# Patient Record
Sex: FEMALE | Race: WHITE | NOT HISPANIC OR LATINO
[De-identification: names, ages, dates, MRNs, and addresses within clinical notes are randomized per-mention and may not be internally consistent; named-entity substitution may affect disease eponyms.]

---

## 2017-08-29 PROBLEM — Z00.00 ENCOUNTER FOR PREVENTIVE HEALTH EXAMINATION: Status: ACTIVE | Noted: 2017-08-29

## 2017-08-30 ENCOUNTER — APPOINTMENT (OUTPATIENT)
Dept: NEUROLOGY | Facility: CLINIC | Age: 32
End: 2017-08-30
Payer: COMMERCIAL

## 2017-08-30 VITALS
OXYGEN SATURATION: 98 % | HEART RATE: 82 BPM | HEIGHT: 66 IN | TEMPERATURE: 98.2 F | SYSTOLIC BLOOD PRESSURE: 92 MMHG | BODY MASS INDEX: 28.93 KG/M2 | DIASTOLIC BLOOD PRESSURE: 58 MMHG | WEIGHT: 180 LBS

## 2017-08-30 DIAGNOSIS — Z80.6 FAMILY HISTORY OF LEUKEMIA: ICD-10-CM

## 2017-08-30 DIAGNOSIS — Z80.3 FAMILY HISTORY OF MALIGNANT NEOPLASM OF BREAST: ICD-10-CM

## 2017-08-30 DIAGNOSIS — Z84.89 FAMILY HISTORY OF OTHER SPECIFIED CONDITIONS: ICD-10-CM

## 2017-08-30 PROCEDURE — 99214 OFFICE O/P EST MOD 30 MIN: CPT

## 2017-09-08 LAB — OXCARBAZEPINE SERPL-MCNC: 11 UG/ML

## 2017-09-25 ENCOUNTER — APPOINTMENT (OUTPATIENT)
Dept: NEUROLOGY | Facility: CLINIC | Age: 32
End: 2017-09-25
Payer: COMMERCIAL

## 2017-09-25 VITALS
SYSTOLIC BLOOD PRESSURE: 108 MMHG | DIASTOLIC BLOOD PRESSURE: 74 MMHG | OXYGEN SATURATION: 97 % | HEART RATE: 83 BPM | TEMPERATURE: 98 F | HEIGHT: 66 IN

## 2017-09-25 DIAGNOSIS — G40.109 LOCALIZATION-RELATED (FOCAL) (PARTIAL) SYMPTOMATIC EPILEPSY AND EPILEPTIC SYNDROMES WITH SIMPLE PARTIAL SEIZURES, NOT INTRACTABLE, W/OUT STATUS EPILEPTICUS: ICD-10-CM

## 2017-09-25 DIAGNOSIS — Z78.9 OTHER SPECIFIED HEALTH STATUS: ICD-10-CM

## 2017-09-25 PROCEDURE — 99214 OFFICE O/P EST MOD 30 MIN: CPT

## 2017-09-26 ENCOUNTER — RX RENEWAL (OUTPATIENT)
Age: 32
End: 2017-09-26

## 2017-09-29 LAB — OXCARBAZEPINE SERPL-MCNC: 11 UG/ML

## 2017-11-01 ENCOUNTER — RX RENEWAL (OUTPATIENT)
Age: 32
End: 2017-11-01

## 2017-12-13 ENCOUNTER — APPOINTMENT (OUTPATIENT)
Dept: NEUROLOGY | Facility: CLINIC | Age: 32
End: 2017-12-13
Payer: COMMERCIAL

## 2017-12-13 ENCOUNTER — LABORATORY RESULT (OUTPATIENT)
Age: 32
End: 2017-12-13

## 2017-12-13 VITALS
WEIGHT: 173.13 LBS | TEMPERATURE: 98.7 F | DIASTOLIC BLOOD PRESSURE: 81 MMHG | BODY MASS INDEX: 27.17 KG/M2 | SYSTOLIC BLOOD PRESSURE: 129 MMHG | HEIGHT: 67 IN | HEART RATE: 75 BPM | OXYGEN SATURATION: 97 %

## 2017-12-13 PROCEDURE — 99214 OFFICE O/P EST MOD 30 MIN: CPT

## 2018-01-11 ENCOUNTER — RX RENEWAL (OUTPATIENT)
Age: 33
End: 2018-01-11

## 2018-04-12 ENCOUNTER — RX RENEWAL (OUTPATIENT)
Age: 33
End: 2018-04-12

## 2018-07-05 ENCOUNTER — OTHER (OUTPATIENT)
Age: 33
End: 2018-07-05

## 2018-07-11 ENCOUNTER — OTHER (OUTPATIENT)
Age: 33
End: 2018-07-11

## 2018-07-11 ENCOUNTER — APPOINTMENT (OUTPATIENT)
Dept: NEUROLOGY | Facility: CLINIC | Age: 33
End: 2018-07-11
Payer: COMMERCIAL

## 2018-07-11 VITALS
WEIGHT: 171 LBS | SYSTOLIC BLOOD PRESSURE: 103 MMHG | HEART RATE: 71 BPM | BODY MASS INDEX: 27.48 KG/M2 | TEMPERATURE: 97.5 F | HEIGHT: 66 IN | OXYGEN SATURATION: 97 % | DIASTOLIC BLOOD PRESSURE: 66 MMHG

## 2018-07-11 PROCEDURE — 99214 OFFICE O/P EST MOD 30 MIN: CPT

## 2018-08-14 ENCOUNTER — RX RENEWAL (OUTPATIENT)
Age: 33
End: 2018-08-14

## 2018-08-22 ENCOUNTER — RX RENEWAL (OUTPATIENT)
Age: 33
End: 2018-08-22

## 2018-09-05 ENCOUNTER — RX CHANGE (OUTPATIENT)
Age: 33
End: 2018-09-05

## 2018-09-05 ENCOUNTER — RX RENEWAL (OUTPATIENT)
Age: 33
End: 2018-09-05

## 2018-10-03 ENCOUNTER — RX RENEWAL (OUTPATIENT)
Age: 33
End: 2018-10-03

## 2018-10-03 ENCOUNTER — OTHER (OUTPATIENT)
Age: 33
End: 2018-10-03

## 2018-10-05 ENCOUNTER — RX RENEWAL (OUTPATIENT)
Age: 33
End: 2018-10-05

## 2018-10-22 ENCOUNTER — RX RENEWAL (OUTPATIENT)
Age: 33
End: 2018-10-22

## 2018-10-24 ENCOUNTER — OTHER (OUTPATIENT)
Age: 33
End: 2018-10-24

## 2018-11-26 ENCOUNTER — APPOINTMENT (OUTPATIENT)
Dept: NEUROLOGY | Facility: CLINIC | Age: 33
End: 2018-11-26
Payer: COMMERCIAL

## 2018-11-26 VITALS
WEIGHT: 165 LBS | HEIGHT: 66 IN | OXYGEN SATURATION: 98 % | HEART RATE: 71 BPM | BODY MASS INDEX: 26.52 KG/M2 | SYSTOLIC BLOOD PRESSURE: 101 MMHG | TEMPERATURE: 97.7 F | DIASTOLIC BLOOD PRESSURE: 65 MMHG

## 2018-11-26 PROCEDURE — 99214 OFFICE O/P EST MOD 30 MIN: CPT

## 2018-12-24 ENCOUNTER — RX RENEWAL (OUTPATIENT)
Age: 33
End: 2018-12-24

## 2019-01-22 ENCOUNTER — RX RENEWAL (OUTPATIENT)
Age: 34
End: 2019-01-22

## 2019-02-28 ENCOUNTER — OTHER (OUTPATIENT)
Age: 34
End: 2019-02-28

## 2019-03-11 ENCOUNTER — APPOINTMENT (OUTPATIENT)
Dept: NEUROLOGY | Facility: CLINIC | Age: 34
End: 2019-03-11
Payer: COMMERCIAL

## 2019-03-11 VITALS
BODY MASS INDEX: 26.7 KG/M2 | DIASTOLIC BLOOD PRESSURE: 70 MMHG | HEART RATE: 68 BPM | SYSTOLIC BLOOD PRESSURE: 114 MMHG | TEMPERATURE: 97.7 F | OXYGEN SATURATION: 99 % | WEIGHT: 136 LBS | HEIGHT: 60 IN

## 2019-03-11 PROCEDURE — 99214 OFFICE O/P EST MOD 30 MIN: CPT

## 2019-03-11 NOTE — DISCUSSION/SUMMARY
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

## 2019-03-11 NOTE — HISTORY OF PRESENT ILLNESS
[FreeTextEntry1] : Follow up\par \par This is a followup on this young woman who has a history of focal epilepsy with an auditory aura and pregnancy.\par \par 34 y/o woman w focal epilepsy\par \par \par \par \par \par \par HPI\par Started Vimpat and while on titration she had some seizures.\par Now on Vimpat 200 mg BID and she is better. \par Working full time\par Baby is doing fine.  \par January 31 had a major a seizure with GTC sz. Had an aura of humming noise and tried to use the Midazolam but it didn’t work. She had a major GTC sz. \par Not having side effects from Vimpat.\par Takes Vimpat 200 mg 8 am and 200 mg PM\par Baby 15 months\par \par \par

## 2019-03-11 NOTE — PHYSICAL EXAM
[Person] : oriented to person [Place] : oriented to place [Time] : oriented to time [Concentration Intact] : normal concentrating ability [Visual Intact] : visual attention was ~T not ~L decreased [Naming Objects] : no difficulty naming common objects [Repeating Phrases] : no difficulty repeating a phrase [Writing A Sentence] : no difficulty writing a sentence [Fluency] : fluency intact [Comprehension] : comprehension intact [Reading] : reading intact [Past History] : adequate knowledge of personal past history [Cranial Nerves Optic (II)] : visual acuity intact bilaterally,  visual fields full to confrontation, pupils equal round and reactive to light [Cranial Nerves Oculomotor (III)] : extraocular motion intact [Cranial Nerves Trigeminal (V)] : facial sensation intact symmetrically [Cranial Nerves Facial (VII)] : face symmetrical [Cranial Nerves Vestibulocochlear (VIII)] : hearing was intact bilaterally [Cranial Nerves Glossopharyngeal (IX)] : tongue and palate midline [Motor Tone] : muscle tone was normal in all four extremities [Motor Strength] : muscle strength was normal in all four extremities [No Muscle Atrophy] : normal bulk in all four extremities [Motor Handedness Left-Handed] : the patient is left hand dominant [Sensation Tactile Decrease] : light touch was intact [Abnormal Walk] : normal gait [Balance] : balance was intact [2+] : Ankle jerk left 2+ [Past-pointing] : there was no past-pointing [Tremor] : no tremor present [Plantar Reflex Right Only] : normal on the right [Plantar Reflex Left Only] : normal on the left

## 2019-03-14 ENCOUNTER — OTHER (OUTPATIENT)
Age: 34
End: 2019-03-14

## 2019-03-20 ENCOUNTER — OTHER (OUTPATIENT)
Age: 34
End: 2019-03-20

## 2019-04-01 ENCOUNTER — OTHER (OUTPATIENT)
Age: 34
End: 2019-04-01

## 2019-04-01 ENCOUNTER — RX RENEWAL (OUTPATIENT)
Age: 34
End: 2019-04-01

## 2019-04-02 ENCOUNTER — APPOINTMENT (OUTPATIENT)
Dept: NEUROLOGY | Facility: CLINIC | Age: 34
End: 2019-04-02
Payer: COMMERCIAL

## 2019-04-02 PROCEDURE — 95816 EEG AWAKE AND DROWSY: CPT

## 2019-04-03 ENCOUNTER — APPOINTMENT (OUTPATIENT)
Dept: NEUROLOGY | Facility: CLINIC | Age: 34
End: 2019-04-03

## 2019-04-03 PROCEDURE — 95953: CPT

## 2019-04-08 ENCOUNTER — OTHER (OUTPATIENT)
Age: 34
End: 2019-04-08

## 2019-04-10 ENCOUNTER — RX RENEWAL (OUTPATIENT)
Age: 34
End: 2019-04-10

## 2019-04-30 ENCOUNTER — OTHER (OUTPATIENT)
Age: 34
End: 2019-04-30

## 2019-05-15 ENCOUNTER — RX RENEWAL (OUTPATIENT)
Age: 34
End: 2019-05-15

## 2019-05-15 ENCOUNTER — OTHER (OUTPATIENT)
Age: 34
End: 2019-05-15

## 2019-05-20 ENCOUNTER — APPOINTMENT (OUTPATIENT)
Dept: NEUROLOGY | Facility: CLINIC | Age: 34
End: 2019-05-20
Payer: COMMERCIAL

## 2019-05-20 VITALS
BODY MASS INDEX: 31.15 KG/M2 | DIASTOLIC BLOOD PRESSURE: 72 MMHG | SYSTOLIC BLOOD PRESSURE: 114 MMHG | HEIGHT: 61 IN | TEMPERATURE: 98.4 F | HEART RATE: 64 BPM | WEIGHT: 165 LBS | OXYGEN SATURATION: 99 %

## 2019-05-20 PROCEDURE — 99214 OFFICE O/P EST MOD 30 MIN: CPT

## 2019-05-20 NOTE — DISCUSSION/SUMMARY
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

## 2019-05-20 NOTE — HISTORY OF PRESENT ILLNESS
[FreeTextEntry1] : Follow up\par \par This is a followup on this young woman who has a history of focal epilepsy with an auditory aura and pregnancy.\par \par 34 y/o woman w focal epilepsy\par \par \par \par \par \par \par HPI\par Started Vimpat and while on titration she had some seizures.\par \par Working full time\par \par January 31 had a major a seizure with GTC sz. Had an aura of humming noise and tried to use the Midazolam but it didn’t work. She had a major GTC sz. \par \par Takes Vimpat 200 mg 8 am and 200 mg PM\par \par Another GTC sz April 8 \par \par Another GTC sz last week\par \par Meds\par Trileptal 300 mg BID\par Vimpat 50 mg QD\par \par \par \par \par \par

## 2019-05-20 NOTE — PHYSICAL EXAM
[Person] : oriented to person [Place] : oriented to place [Time] : oriented to time [Concentration Intact] : normal concentrating ability [Visual Intact] : visual attention was ~T not ~L decreased [Naming Objects] : no difficulty naming common objects [Repeating Phrases] : no difficulty repeating a phrase [Writing A Sentence] : no difficulty writing a sentence [Fluency] : fluency intact [Comprehension] : comprehension intact [Reading] : reading intact [Past History] : adequate knowledge of personal past history [Cranial Nerves Optic (II)] : visual acuity intact bilaterally,  visual fields full to confrontation, pupils equal round and reactive to light [Cranial Nerves Oculomotor (III)] : extraocular motion intact [Cranial Nerves Trigeminal (V)] : facial sensation intact symmetrically [Cranial Nerves Facial (VII)] : face symmetrical [Cranial Nerves Vestibulocochlear (VIII)] : hearing was intact bilaterally [Cranial Nerves Glossopharyngeal (IX)] : tongue and palate midline [Motor Tone] : muscle tone was normal in all four extremities [Motor Strength] : muscle strength was normal in all four extremities [No Muscle Atrophy] : normal bulk in all four extremities [Motor Handedness Left-Handed] : the patient is left hand dominant [Sensation Tactile Decrease] : light touch was intact [Abnormal Walk] : normal gait [Balance] : balance was intact [Past-pointing] : there was no past-pointing [Tremor] : no tremor present [2+] : Ankle jerk left 2+ [Plantar Reflex Right Only] : normal on the right [Plantar Reflex Left Only] : normal on the left

## 2019-05-20 NOTE — ASSESSMENT
[FreeTextEntry1] : Increase Trileptal to 300 mg am and 450 mg PM\par Levels from last week and adjust if needed

## 2019-05-21 ENCOUNTER — OTHER (OUTPATIENT)
Age: 34
End: 2019-05-21

## 2019-05-22 ENCOUNTER — RX RENEWAL (OUTPATIENT)
Age: 34
End: 2019-05-22

## 2019-05-22 ENCOUNTER — OTHER (OUTPATIENT)
Age: 34
End: 2019-05-22

## 2019-06-05 ENCOUNTER — RX RENEWAL (OUTPATIENT)
Age: 34
End: 2019-06-05

## 2019-06-11 ENCOUNTER — RX RENEWAL (OUTPATIENT)
Age: 34
End: 2019-06-11

## 2019-06-12 ENCOUNTER — OTHER (OUTPATIENT)
Age: 34
End: 2019-06-12

## 2019-06-17 ENCOUNTER — OTHER (OUTPATIENT)
Age: 34
End: 2019-06-17

## 2019-06-19 ENCOUNTER — OTHER (OUTPATIENT)
Age: 34
End: 2019-06-19

## 2019-07-03 ENCOUNTER — OTHER (OUTPATIENT)
Age: 34
End: 2019-07-03

## 2019-07-29 ENCOUNTER — RX RENEWAL (OUTPATIENT)
Age: 34
End: 2019-07-29

## 2019-08-05 ENCOUNTER — APPOINTMENT (OUTPATIENT)
Dept: NEUROLOGY | Facility: CLINIC | Age: 34
End: 2019-08-05

## 2019-08-12 ENCOUNTER — APPOINTMENT (OUTPATIENT)
Dept: NEUROLOGY | Facility: CLINIC | Age: 34
End: 2019-08-12
Payer: COMMERCIAL

## 2019-08-12 VITALS
WEIGHT: 165 LBS | HEART RATE: 95 BPM | SYSTOLIC BLOOD PRESSURE: 128 MMHG | DIASTOLIC BLOOD PRESSURE: 80 MMHG | HEIGHT: 61 IN | BODY MASS INDEX: 31.15 KG/M2 | OXYGEN SATURATION: 96 %

## 2019-08-12 DIAGNOSIS — Z3A.27 27 WEEKS GESTATION OF PREGNANCY: ICD-10-CM

## 2019-08-12 PROCEDURE — 99214 OFFICE O/P EST MOD 30 MIN: CPT

## 2019-08-12 RX ORDER — LACOSAMIDE 200 MG/1
200 TABLET, FILM COATED ORAL
Qty: 60 | Refills: 2 | Status: DISCONTINUED | COMMUNITY
Start: 2018-07-11 | End: 2019-08-12

## 2019-08-12 NOTE — REVIEW OF SYSTEMS
[Decr. Concentrating Ability] : decreased concentrating ability [Seizures] : convulsions [Negative] : Musculoskeletal

## 2019-08-12 NOTE — HISTORY OF PRESENT ILLNESS
[FreeTextEntry1] : Follow up\par \par This is a followup on this young woman who has a history of focal epilepsy with an auditory aura and pregnancy.\par \par 35 y/o woman w focal epilepsy\par \par \par \par \par \par \par HPI\par Started Vimpat and while on titration she had some seizures.\par \par Working full time\par \par January 31 had a major a seizure with GTC sz. Had an aura of humming noise and tried to use the Midazolam but it didn’t work. She had a major GTC sz. \par \par Pregnancy 20 week gestation\par \par last Sz GTC May\par \par \par Another GTC sz April 8 \par \par \par Meds\par Trileptal 750 mg BID\par Folic acid 2 mg/day\par PRN Klonopin \par \par \par \par \par \par \par \par

## 2019-08-12 NOTE — DISCUSSION/SUMMARY
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

## 2019-08-15 LAB — OXCARBAZEPINE SERPL-MCNC: 19 UG/ML

## 2019-08-19 ENCOUNTER — RX RENEWAL (OUTPATIENT)
Age: 34
End: 2019-08-19

## 2019-08-23 ENCOUNTER — OTHER (OUTPATIENT)
Age: 34
End: 2019-08-23

## 2019-09-16 ENCOUNTER — OTHER (OUTPATIENT)
Age: 34
End: 2019-09-16

## 2019-09-16 ENCOUNTER — RX RENEWAL (OUTPATIENT)
Age: 34
End: 2019-09-16

## 2019-10-28 ENCOUNTER — APPOINTMENT (OUTPATIENT)
Dept: NEUROLOGY | Facility: CLINIC | Age: 34
End: 2019-10-28
Payer: COMMERCIAL

## 2019-10-28 VITALS
WEIGHT: 165 LBS | DIASTOLIC BLOOD PRESSURE: 68 MMHG | HEIGHT: 61 IN | OXYGEN SATURATION: 98 % | BODY MASS INDEX: 31.15 KG/M2 | TEMPERATURE: 98.1 F | HEART RATE: 78 BPM | SYSTOLIC BLOOD PRESSURE: 109 MMHG

## 2019-10-28 PROCEDURE — 99214 OFFICE O/P EST MOD 30 MIN: CPT

## 2019-10-28 NOTE — DISCUSSION/SUMMARY
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

## 2019-10-28 NOTE — HISTORY OF PRESENT ILLNESS
[FreeTextEntry1] : Follow up\par \par This is a followup on this young woman who has a history of focal epilepsy with an auditory aura and pregnancy.\par \par 35 y/o woman w focal epilepsy\par \par \par \par \par \par \par HPI\par trileptal up to 900 mg am and 750 mg PM\par No auras or GTc sz but feeling in the head 1-2 week. Last minutes. \par \par \par Pregnancy 32 week gestation\par \par Due date Dec 26, 2019\par \par last Sz GTC May\par \par \par Another GTC sz April 8, 2019\par \par \par Meds\par Trileptal 750 mg BID\par Folic acid 2 mg/day\par PRN Klonopin \par \par \par \par \par \par \par \par

## 2019-10-29 ENCOUNTER — RX RENEWAL (OUTPATIENT)
Age: 34
End: 2019-10-29

## 2019-10-29 LAB
BASOPHILS # BLD AUTO: 0.01 K/UL
BASOPHILS NFR BLD AUTO: 0.2 %
EOSINOPHIL # BLD AUTO: 0.06 K/UL
EOSINOPHIL NFR BLD AUTO: 0.9 %
HCT VFR BLD CALC: 34.2 %
HGB BLD-MCNC: 11.4 G/DL
IMM GRANULOCYTES NFR BLD AUTO: 0.5 %
LYMPHOCYTES # BLD AUTO: 1.62 K/UL
LYMPHOCYTES NFR BLD AUTO: 25 %
MAN DIFF?: NORMAL
MCHC RBC-ENTMCNC: 30.7 PG
MCHC RBC-ENTMCNC: 33.3 GM/DL
MCV RBC AUTO: 92.2 FL
MONOCYTES # BLD AUTO: 0.41 K/UL
MONOCYTES NFR BLD AUTO: 6.3 %
NEUTROPHILS # BLD AUTO: 4.35 K/UL
NEUTROPHILS NFR BLD AUTO: 67.1 %
PLATELET # BLD AUTO: 185 K/UL
RBC # BLD: 3.71 M/UL
RBC # FLD: 12.3 %
WBC # FLD AUTO: 6.48 K/UL

## 2019-10-31 LAB — OXCARBAZEPINE SERPL-MCNC: 13 UG/ML

## 2019-11-08 ENCOUNTER — RX RENEWAL (OUTPATIENT)
Age: 34
End: 2019-11-08

## 2019-11-19 ENCOUNTER — TRANSCRIPTION ENCOUNTER (OUTPATIENT)
Age: 34
End: 2019-11-19

## 2019-11-20 ENCOUNTER — TRANSCRIPTION ENCOUNTER (OUTPATIENT)
Age: 34
End: 2019-11-20

## 2019-11-25 ENCOUNTER — TRANSCRIPTION ENCOUNTER (OUTPATIENT)
Age: 34
End: 2019-11-25

## 2019-11-25 ENCOUNTER — RX RENEWAL (OUTPATIENT)
Age: 34
End: 2019-11-25

## 2019-11-26 ENCOUNTER — TRANSCRIPTION ENCOUNTER (OUTPATIENT)
Age: 34
End: 2019-11-26

## 2019-12-03 ENCOUNTER — RX RENEWAL (OUTPATIENT)
Age: 34
End: 2019-12-03

## 2019-12-03 ENCOUNTER — TRANSCRIPTION ENCOUNTER (OUTPATIENT)
Age: 34
End: 2019-12-03

## 2019-12-12 ENCOUNTER — OTHER (OUTPATIENT)
Age: 34
End: 2019-12-12

## 2019-12-16 ENCOUNTER — OTHER (OUTPATIENT)
Age: 34
End: 2019-12-16

## 2019-12-27 ENCOUNTER — TRANSCRIPTION ENCOUNTER (OUTPATIENT)
Age: 34
End: 2019-12-27

## 2020-01-15 ENCOUNTER — OTHER (OUTPATIENT)
Age: 35
End: 2020-01-15

## 2020-01-27 ENCOUNTER — APPOINTMENT (OUTPATIENT)
Dept: NEUROLOGY | Facility: CLINIC | Age: 35
End: 2020-01-27
Payer: COMMERCIAL

## 2020-01-27 ENCOUNTER — RX RENEWAL (OUTPATIENT)
Age: 35
End: 2020-01-27

## 2020-01-27 VITALS
HEART RATE: 69 BPM | DIASTOLIC BLOOD PRESSURE: 73 MMHG | BODY MASS INDEX: 31.15 KG/M2 | OXYGEN SATURATION: 99 % | HEIGHT: 61 IN | WEIGHT: 165 LBS | SYSTOLIC BLOOD PRESSURE: 156 MMHG | TEMPERATURE: 98 F

## 2020-01-27 PROCEDURE — 99214 OFFICE O/P EST MOD 30 MIN: CPT

## 2020-01-28 NOTE — PHYSICAL EXAM
[FreeTextEntry1] : Neurologic: \par Orientation: oriented to person, oriented to place and oriented to time. \par Attention: normal concentrating ability and visual attention was not decreased. \par Language: no difficulty naming common objects, no difficulty repeating a phrase, no difficulty writing a sentence, fluency intact, comprehension intact and reading intact. \par Fund of knowledge: displays adequate knowledge of personal past history. \par Cranial Nerves: visual acuity intact bilaterally, visual fields full to confrontation, pupils equal round and reactive to light, extraocular motion intact, facial sensation intact symmetrically, face symmetrical, hearing was intact bilaterally and tongue and palate midline. \par Motor: muscle tone was normal in all four extremities, muscle strength was normal in all four extremities and normal bulk in all four extremities. \par Motor Strength:. the patient is left hand dominant. \par Sensory exam: light touch was intact. \par Coordination:. normal gait. balance was intact. there was no past-pointing. no tremor present. \par Deep tendon reflexes: \par Biceps right 2+. Biceps left 2+.  \par Triceps right 2+. Triceps left 2+.  \par Brachioradialis right 2+. Brachioradialis left 2+.  \par Patella right 2+. Patella left 2+.  \par Ankle jerk right 2+. Ankle jerk left 2+. \par Plantar responses normal on the right, normal on the left.  \par  \par

## 2020-01-28 NOTE — ASSESSMENT
[FreeTextEntry1] : Seizures (780.39) (R56.9)\par \par Have oxcarb level done today. \par Depending on oxcarb level, (take 830 am) will possibly decrease trileptal to 600 mg BID. \par RTC 4 months.

## 2020-01-28 NOTE — HISTORY OF PRESENT ILLNESS
[FreeTextEntry1] : Britni is a 34 year old female who is here for a follow up appointment. Last seen since October 2019. She had a baby, who is about 6 weeks old. Headaches but they don’t really bother her. Last seizure around November 2019. Wants to continue tapering trileptal, denies auras and seizures. \par \par Current medication: \par Trileptal 600 mg AM and 750 mg at night. (decreased to this dose about 2 weeks ago).\par Folic acid 2 mg/day\par PRN Klonopin

## 2020-01-28 NOTE — DISCUSSION/SUMMARY
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  \par \par Examined  (6 weeks). Birthweight 6 lbs 15 oz. 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 She also has one on the back of the neck and lower left back.

## 2020-01-30 LAB — OXCARBAZEPINE SERPL-MCNC: 19 UG/ML

## 2020-01-31 ENCOUNTER — TRANSCRIPTION ENCOUNTER (OUTPATIENT)
Age: 35
End: 2020-01-31

## 2020-03-05 ENCOUNTER — RX RENEWAL (OUTPATIENT)
Age: 35
End: 2020-03-05

## 2020-03-09 ENCOUNTER — RX RENEWAL (OUTPATIENT)
Age: 35
End: 2020-03-09

## 2020-03-11 ENCOUNTER — TRANSCRIPTION ENCOUNTER (OUTPATIENT)
Age: 35
End: 2020-03-11

## 2020-03-16 ENCOUNTER — TRANSCRIPTION ENCOUNTER (OUTPATIENT)
Age: 35
End: 2020-03-16

## 2020-04-02 PROBLEM — G40.109 PARTIAL EPILEPSY: Status: RESOLVED | Noted: 2017-08-30 | Resolved: 2020-04-02

## 2020-06-15 ENCOUNTER — RX RENEWAL (OUTPATIENT)
Age: 35
End: 2020-06-15

## 2020-07-06 ENCOUNTER — TRANSCRIPTION ENCOUNTER (OUTPATIENT)
Age: 35
End: 2020-07-06

## 2020-07-20 ENCOUNTER — APPOINTMENT (OUTPATIENT)
Dept: NEUROLOGY | Facility: CLINIC | Age: 35
End: 2020-07-20

## 2020-07-21 ENCOUNTER — TRANSCRIPTION ENCOUNTER (OUTPATIENT)
Age: 35
End: 2020-07-21

## 2020-07-22 ENCOUNTER — TRANSCRIPTION ENCOUNTER (OUTPATIENT)
Age: 35
End: 2020-07-22

## 2020-08-10 ENCOUNTER — APPOINTMENT (OUTPATIENT)
Dept: NEUROLOGY | Facility: CLINIC | Age: 35
End: 2020-08-10
Payer: COMMERCIAL

## 2020-08-10 VITALS
BODY MASS INDEX: 34.55 KG/M2 | HEIGHT: 61 IN | WEIGHT: 183 LBS | HEART RATE: 79 BPM | TEMPERATURE: 98.2 F | SYSTOLIC BLOOD PRESSURE: 126 MMHG | DIASTOLIC BLOOD PRESSURE: 71 MMHG | OXYGEN SATURATION: 99 %

## 2020-08-10 PROCEDURE — 99214 OFFICE O/P EST MOD 30 MIN: CPT

## 2020-08-10 RX ORDER — CLONAZEPAM 0.25 MG/1
0.25 TABLET, ORALLY DISINTEGRATING ORAL
Qty: 30 | Refills: 0 | Status: DISCONTINUED | COMMUNITY
Start: 2017-09-25 | End: 2020-08-10

## 2020-08-10 RX ORDER — NYSTATIN AND TRIAMCINOLONE ACETONIDE 100000; 1 [USP'U]/G; MG/G
100000-0.1 OINTMENT TOPICAL
Qty: 60 | Refills: 0 | Status: DISCONTINUED | COMMUNITY
Start: 2019-09-17 | End: 2020-08-10

## 2020-08-10 RX ORDER — MIDAZOLAM 1 MG/ML
5 INJECTION INTRAMUSCULAR; INTRAVENOUS
Qty: 1 | Refills: 0 | Status: DISCONTINUED | COMMUNITY
Start: 2018-07-11 | End: 2020-08-10

## 2020-08-10 NOTE — REVIEW OF SYSTEMS
[Recent Weight Gain (___ Lbs)] : recent [unfilled] ~Ulb weight gain [As Noted in HPI] : as noted in HPI [Negative] : Musculoskeletal

## 2020-08-10 NOTE — DISCUSSION/SUMMARY
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

## 2020-08-10 NOTE — ASSESSMENT
[FreeTextEntry1] : continue Trileptal 450 mg BID\par Trileptal Levels \par If the levels are much higher may cut some now. \par Genetic testing for focal epilepsy LI1G\par

## 2020-08-10 NOTE — HISTORY OF PRESENT ILLNESS
[FreeTextEntry1] : Follow up\par \par This is a followup on this 35 woman who has a history of focal epilepsy with an auditory aura and pregnancy.\par \par \par \par \par \par \par HPI\par trileptal up to 900 mg am and 750 mg PM\par No auras or GTc sz but feeling in the head 1-2 week. Last minutes. \par \par \par Delivery Dec 2019. No complications. Sixth children\par \par last Sz GTC May 2019\par \par \par Another GTC sz April 8, 2019\par Headaches less often. R sided\par \par Meds\par Trileptal 450 mg BID\par Folic acid 2 mg/day\par PRN Klonopin \par \par \par \par \par \par \par \par

## 2020-08-10 NOTE — PHYSICAL EXAM
[Time] : oriented to time [Person] : oriented to person [Place] : oriented to place [Concentration Intact] : normal concentrating ability [Visual Intact] : visual attention was ~T not ~L decreased [Repeating Phrases] : no difficulty repeating a phrase [Naming Objects] : no difficulty naming common objects [Writing A Sentence] : no difficulty writing a sentence [Fluency] : fluency intact [Comprehension] : comprehension intact [Reading] : reading intact [Past History] : adequate knowledge of personal past history [Cranial Nerves Optic (II)] : visual acuity intact bilaterally,  visual fields full to confrontation, pupils equal round and reactive to light [Cranial Nerves Trigeminal (V)] : facial sensation intact symmetrically [Cranial Nerves Oculomotor (III)] : extraocular motion intact [Cranial Nerves Glossopharyngeal (IX)] : tongue and palate midline [Cranial Nerves Vestibulocochlear (VIII)] : hearing was intact bilaterally [Cranial Nerves Facial (VII)] : face symmetrical [No Muscle Atrophy] : normal bulk in all four extremities [Motor Tone] : muscle tone was normal in all four extremities [Motor Strength] : muscle strength was normal in all four extremities [Abnormal Walk] : normal gait [Motor Handedness Left-Handed] : the patient is left hand dominant [Sensation Tactile Decrease] : light touch was intact [Balance] : balance was intact [2+] : Ankle jerk left 2+ [Past-pointing] : there was no past-pointing [Tremor] : no tremor present [Plantar Reflex Right Only] : normal on the right [Plantar Reflex Left Only] : normal on the left

## 2020-12-09 ENCOUNTER — FORM ENCOUNTER (OUTPATIENT)
Age: 35
End: 2020-12-09

## 2020-12-14 ENCOUNTER — APPOINTMENT (OUTPATIENT)
Dept: NEUROLOGY | Facility: CLINIC | Age: 35
End: 2020-12-14
Payer: COMMERCIAL

## 2020-12-14 VITALS
HEIGHT: 61 IN | BODY MASS INDEX: 34.55 KG/M2 | DIASTOLIC BLOOD PRESSURE: 75 MMHG | TEMPERATURE: 97.1 F | HEART RATE: 63 BPM | WEIGHT: 183 LBS | SYSTOLIC BLOOD PRESSURE: 124 MMHG | OXYGEN SATURATION: 100 %

## 2020-12-14 PROCEDURE — 99214 OFFICE O/P EST MOD 30 MIN: CPT

## 2020-12-14 PROCEDURE — 99072 ADDL SUPL MATRL&STAF TM PHE: CPT

## 2020-12-14 NOTE — DISCUSSION/SUMMARY
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

## 2020-12-14 NOTE — HISTORY OF PRESENT ILLNESS
[FreeTextEntry1] : Follow up\par \par This is a followup on this 35 woman who has a history of focal epilepsy with an auditory aura and pregnancy.\par \par \par \par \par \par \par HPI\par trileptal 450 BID\par No auras or seizures in the past 3 m\par \par COVID in Sept 2019\par \par Delivery Dec 2019. No complications. Sixth children\par \par last Sz GTC May 2019\par HA when she forgets the medicines\par \par Did the genetic test sent 3 weeks ago. \par \par 6th pregnancy. \par \par Meds\par Trileptal 450 mg BID\par Folic acid 2 mg/day\par \par \par \par \par \par \par \par \par

## 2020-12-16 LAB
ALBUMIN SERPL ELPH-MCNC: 4.7 G/DL
ALP BLD-CCNC: 85 U/L
ALT SERPL-CCNC: 14 U/L
AST SERPL-CCNC: 16 U/L
BASOPHILS # BLD AUTO: 0.02 K/UL
BASOPHILS NFR BLD AUTO: 0.4 %
BILIRUB DIRECT SERPL-MCNC: 0.1 MG/DL
BILIRUB INDIRECT SERPL-MCNC: 0.2 MG/DL
BILIRUB SERPL-MCNC: 0.2 MG/DL
EOSINOPHIL # BLD AUTO: 0.09 K/UL
EOSINOPHIL NFR BLD AUTO: 2 %
HCT VFR BLD CALC: 40.5 %
HGB BLD-MCNC: 13.9 G/DL
IMM GRANULOCYTES NFR BLD AUTO: 0.2 %
LYMPHOCYTES # BLD AUTO: 1.63 K/UL
LYMPHOCYTES NFR BLD AUTO: 36.1 %
MAN DIFF?: NORMAL
MCHC RBC-ENTMCNC: 33.5 PG
MCHC RBC-ENTMCNC: 34.3 GM/DL
MCV RBC AUTO: 97.6 FL
MONOCYTES # BLD AUTO: 0.31 K/UL
MONOCYTES NFR BLD AUTO: 6.9 %
NEUTROPHILS # BLD AUTO: 2.46 K/UL
NEUTROPHILS NFR BLD AUTO: 54.4 %
PLATELET # BLD AUTO: 192 K/UL
PROT SERPL-MCNC: 7.5 G/DL
RBC # BLD: 4.15 M/UL
RBC # FLD: 15.8 %
WBC # FLD AUTO: 4.52 K/UL

## 2020-12-23 LAB — OXCARBAZEPINE SERPL-MCNC: 10 UG/ML

## 2021-02-08 ENCOUNTER — FORM ENCOUNTER (OUTPATIENT)
Age: 36
End: 2021-02-08

## 2021-03-19 ENCOUNTER — RX RENEWAL (OUTPATIENT)
Age: 36
End: 2021-03-19

## 2021-05-27 ENCOUNTER — APPOINTMENT (OUTPATIENT)
Dept: NEUROLOGY | Facility: CLINIC | Age: 36
End: 2021-05-27
Payer: SELF-PAY

## 2021-05-27 VITALS
HEIGHT: 66 IN | OXYGEN SATURATION: 98 % | HEART RATE: 62 BPM | BODY MASS INDEX: 28.12 KG/M2 | WEIGHT: 175 LBS | DIASTOLIC BLOOD PRESSURE: 72 MMHG | TEMPERATURE: 97.7 F | SYSTOLIC BLOOD PRESSURE: 115 MMHG

## 2021-05-27 PROCEDURE — 99212 OFFICE O/P EST SF 10 MIN: CPT

## 2021-05-27 NOTE — HISTORY OF PRESENT ILLNESS
[FreeTextEntry1] : Follow up\par \par This is a followup on this 35 woman who has a history of focal epilepsy with an auditory aura and pregnancy.\par \par \par \par \par \par \par HPI\par trileptal 450 BID\par Folic acid 2 mg\par \par No auras or seizures in the past 3 m\par \par COVID in Sept 2019\par \par Delivery Dec 2019. No complications. Sixth children\par \par last Sz GTC May 2019\par HA when she forgets the medicines\par \par Did the genetic test sent 3 weeks ago. \par 18 months girl. Doing well\par 4 girl and 2 boys. \par \par \par Meds\par Trileptal 450 mg BID\par Folic acid 2 mg/day\par \par \par \par \par \par \par \par \par

## 2021-05-27 NOTE — DISCUSSION/SUMMARY
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 \par Genetic test negative \par Stable\par Recent Pregnancy

## 2021-07-19 ENCOUNTER — RX RENEWAL (OUTPATIENT)
Age: 36
End: 2021-07-19

## 2021-11-22 ENCOUNTER — RX RENEWAL (OUTPATIENT)
Age: 36
End: 2021-11-22

## 2021-11-23 ENCOUNTER — APPOINTMENT (OUTPATIENT)
Dept: NEUROLOGY | Facility: CLINIC | Age: 36
End: 2021-11-23
Payer: SELF-PAY

## 2021-11-23 VITALS
DIASTOLIC BLOOD PRESSURE: 81 MMHG | SYSTOLIC BLOOD PRESSURE: 124 MMHG | TEMPERATURE: 97.4 F | BODY MASS INDEX: 29.16 KG/M2 | HEIGHT: 65 IN | HEART RATE: 96 BPM | WEIGHT: 175 LBS | OXYGEN SATURATION: 98 %

## 2021-11-23 PROCEDURE — 99212 OFFICE O/P EST SF 10 MIN: CPT

## 2021-11-23 NOTE — DISCUSSION/SUMMARY
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  \par Stable\par New Pregnancy

## 2021-11-23 NOTE — ASSESSMENT
[FreeTextEntry1] : trileptal 450 BID\par Folic acid 2 mg\par Levels today\par COVID test\par RTC in 3 m

## 2021-11-23 NOTE — HISTORY OF PRESENT ILLNESS
[FreeTextEntry1] : Follow up\par \par This is a followup on this 35 woman who has a history of focal epilepsy with an auditory aura and pregnancy.\par \par \par \par HPI\par No auras or seizures\par Got pregnant last month. Due date unknown\par \par trileptal 450 BID\par Folic acid 2 mg\par \par \par COVID in Sept 2019\par \par \par last Sz GTC May 2019\par HA when she forgets the medicine\par \par \par \par \par \par \par \par \par

## 2022-01-11 ENCOUNTER — NON-APPOINTMENT (OUTPATIENT)
Age: 37
End: 2022-01-11

## 2022-02-17 ENCOUNTER — FORM ENCOUNTER (OUTPATIENT)
Age: 37
End: 2022-02-17

## 2022-04-20 ENCOUNTER — FORM ENCOUNTER (OUTPATIENT)
Age: 37
End: 2022-04-20

## 2022-05-24 ENCOUNTER — NON-APPOINTMENT (OUTPATIENT)
Age: 37
End: 2022-05-24

## 2022-05-24 ENCOUNTER — APPOINTMENT (OUTPATIENT)
Dept: NEUROLOGY | Facility: CLINIC | Age: 37
End: 2022-05-24
Payer: SELF-PAY

## 2022-05-24 VITALS
HEIGHT: 66 IN | BODY MASS INDEX: 30.05 KG/M2 | DIASTOLIC BLOOD PRESSURE: 68 MMHG | OXYGEN SATURATION: 97 % | SYSTOLIC BLOOD PRESSURE: 104 MMHG | HEART RATE: 76 BPM | TEMPERATURE: 97.9 F | WEIGHT: 187 LBS

## 2022-05-24 DIAGNOSIS — Z3A.32 32 WEEKS GESTATION OF PREGNANCY: ICD-10-CM

## 2022-05-24 PROCEDURE — 99214 OFFICE O/P EST MOD 30 MIN: CPT

## 2022-06-22 NOTE — DISCUSSION/SUMMARY
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  \par Stable\par \par New Pregnancy\par  32 weeks gestation today \par Due 7/23/2022 \par \par Plan: Call office after giving birth and to follow up after 2 to 3 months\par

## 2022-06-22 NOTE — PHYSICAL EXAM
[Person] : oriented to person [Place] : oriented to place [Time] : oriented to time [Concentration Intact] : normal concentrating ability [Visual Intact] : visual attention was ~T not ~L decreased [Naming Objects] : no difficulty naming common objects [Repeating Phrases] : no difficulty repeating a phrase [Writing A Sentence] : no difficulty writing a sentence [Fluency] : fluency intact [Comprehension] : comprehension intact [Reading] : reading intact [Past History] : adequate knowledge of personal past history [Cranial Nerves Optic (II)] : visual acuity intact bilaterally,  visual fields full to confrontation, pupils equal round and reactive to light [Cranial Nerves Oculomotor (III)] : extraocular motion intact [Cranial Nerves Trigeminal (V)] : facial sensation intact symmetrically [Cranial Nerves Facial (VII)] : face symmetrical [Cranial Nerves Vestibulocochlear (VIII)] : hearing was intact bilaterally [Cranial Nerves Glossopharyngeal (IX)] : tongue and palate midline [Motor Tone] : muscle tone was normal in all four extremities [Motor Strength] : muscle strength was normal in all four extremities [No Muscle Atrophy] : normal bulk in all four extremities [Motor Handedness Left-Handed] : the patient is left hand dominant [Sensation Tactile Decrease] : light touch was intact [Abnormal Walk] : normal gait [Balance] : balance was intact [2+] : Ankle jerk left 2+ [FreeTextEntry1] : GEN: NAD, pleasant, cooperative\par CHEST: No signs of  distress, on room air\par NEURO: \par   MENTAL STATUS: AAOx3\par   LANG/SPEECH: Fluent, intact \par   CRANIAL NERVES:\par   II: Pupils equal and reactive, no RAPD, normal visual field and fundus\par   III, IV, VI: EOM intact, no gaze preference or deviation\par   V: normal\par   VII: no facial asymmetry\par   VIII: normal hearing to speech\par   MOTOR: 5/5 in both upper and lower extremities\par   REFLEXES: 2/4 throughout, bilateral flexor plantars\par   SENSORY: Normal to touch, temperature & pin prick in all extremities\par   COORD: Normal finger to nose and heel to shin, no tremor, no dysmetria  [Past-pointing] : there was no past-pointing [Tremor] : no tremor present [Plantar Reflex Right Only] : normal on the right [Plantar Reflex Left Only] : normal on the left

## 2022-06-22 NOTE — HISTORY OF PRESENT ILLNESS
[FreeTextEntry1] : Follow up\par \par intervl hx-\par \par 37 years old female being seen for a follow up visit\par \par 32 weeks gestation\par 7/23/2022 due date\par \par patient has a history of focal epilepsy with auditory aura.\par \par Patient says she has been feeling well, no symptoms or adverse effects reported since on Trileptal medication. \par Denies seizure activities. \par Last aura was 3 weeks ago, described aura as hearing noises on both side of the ears. Patient said she was dehydrated. \par \par Continue home medication with Trileptal: \par 600 mg in the morning\par 750 mg at night\par \par Rose is advised to call the office after giving birth. \par \par \par ---------------------\par from 11/23-\par This is a followup on this 35 woman who has a history of focal epilepsy with an auditory aura and pregnancy.\par \par \par HPI\par No auras or seizures\par Got pregnant last month. Due date unknown\par \par trileptal 450 BID\par Folic acid 2 mg\par \par \par COVID in Sept 2019\par \par \par last Sz GTC May 2019\par HA when she forgets the medicine\par \par \par \par \par \par \par \par \par

## 2022-11-01 ENCOUNTER — APPOINTMENT (OUTPATIENT)
Dept: NEUROLOGY | Facility: CLINIC | Age: 37
End: 2022-11-01

## 2022-11-15 ENCOUNTER — RX RENEWAL (OUTPATIENT)
Age: 37
End: 2022-11-15

## 2023-01-12 ENCOUNTER — APPOINTMENT (OUTPATIENT)
Dept: NEUROLOGY | Facility: CLINIC | Age: 38
End: 2023-01-12
Payer: SELF-PAY

## 2023-01-12 VITALS
BODY MASS INDEX: 30.86 KG/M2 | HEIGHT: 66 IN | TEMPERATURE: 97.3 F | DIASTOLIC BLOOD PRESSURE: 77 MMHG | WEIGHT: 192 LBS | HEART RATE: 64 BPM | SYSTOLIC BLOOD PRESSURE: 118 MMHG | OXYGEN SATURATION: 97 %

## 2023-01-12 PROCEDURE — 99214 OFFICE O/P EST MOD 30 MIN: CPT

## 2023-01-12 NOTE — PHYSICAL EXAM
[General Appearance - Alert] : alert [General Appearance - In No Acute Distress] : in no acute distress [General Appearance - Well-Appearing] : healthy appearing [Oriented To Time, Place, And Person] : oriented to person, place, and time [Cranial Nerves Optic (II)] : visual acuity intact bilaterally,  visual fields full to confrontation, pupils equal round and reactive to light [Cranial Nerves Oculomotor (III)] : extraocular motion intact [Abnormal Walk] : normal gait [Balance] : balance was intact [2+] : Ankle jerk left 2+ [PERRL With Normal Accommodation] : pupils were equal in size, round, reactive to light, with normal accommodation [Extraocular Movements] : extraocular movements were intact

## 2023-01-12 NOTE — HISTORY OF PRESENT ILLNESS
[FreeTextEntry1] : Follow up\par \par HPI 1/12/23\par Gave birth June 28th, 2022 at 36 weeks, baby born breach, 5lbs, 8oz. Healthy baby, 7th child\par Auras in June, increased Trileptal, no auras or seizures since\par Checked Trileptal level with PCP, reports it was 13\par \par Meds:\par Trileptal 450 BID\par Folic Acid 1mg BID\par \par History of Present Illness\par \par 37 years old female being seen for a follow up visit\par \par 32 weeks gestation\par 7/23/2022 due date\par \par patient has a history of focal epilepsy with auditory aura.\par \par Patient says she has been feeling well, no symptoms or adverse effects reported since on Trileptal medication. \par Denies seizure activities. \par Last aura was 3 weeks ago, described aura as hearing noises on both side of the ears. Patient said she was dehydrated. \par \par Continue home medication with Trileptal: \par 600 mg in the morning\par 750 mg at night\par \par Rose is advised to call the office after giving birth. \par \par last Sz GTC May 2019\par HA when she forgets the medicine\par \par

## 2023-01-12 NOTE — REVIEW OF SYSTEMS
[As Noted in HPI] : as noted in HPI [Fever] : no fever [Chills] : no chills [Feeling Poorly] : not feeling poorly [Feeling Tired] : not feeling tired [Seizures] : convulsions [Sleep Disturbances] : no sleep disturbances [Change In Personality] : no personality change [Emotional Problems] : no emotional problems [Eyesight Problems] : no eyesight problems [Abdominal Pain] : no abdominal pain [de-identified] : seizures

## 2023-01-12 NOTE — DISCUSSION/SUMMARY
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  \par Stable\par New successful Pregnancy

## 2023-02-08 RX ORDER — CLONAZEPAM 0.25 MG/1
0.25 TABLET, ORALLY DISINTEGRATING ORAL
Qty: 10 | Refills: 0 | Status: ACTIVE | COMMUNITY
Start: 2023-02-08 | End: 1900-01-01

## 2023-09-01 ENCOUNTER — RX RENEWAL (OUTPATIENT)
Age: 38
End: 2023-09-01

## 2023-11-08 ENCOUNTER — APPOINTMENT (OUTPATIENT)
Dept: NEUROLOGY | Facility: CLINIC | Age: 38
End: 2023-11-08
Payer: MEDICAID

## 2023-11-08 VITALS
DIASTOLIC BLOOD PRESSURE: 73 MMHG | BODY MASS INDEX: 28.93 KG/M2 | HEART RATE: 58 BPM | HEIGHT: 66 IN | SYSTOLIC BLOOD PRESSURE: 109 MMHG | WEIGHT: 180 LBS

## 2023-11-08 PROCEDURE — 99213 OFFICE O/P EST LOW 20 MIN: CPT

## 2024-02-06 ENCOUNTER — NON-APPOINTMENT (OUTPATIENT)
Age: 39
End: 2024-02-06

## 2024-05-08 ENCOUNTER — APPOINTMENT (OUTPATIENT)
Dept: NEUROLOGY | Facility: CLINIC | Age: 39
End: 2024-05-08
Payer: MEDICAID

## 2024-05-08 VITALS
SYSTOLIC BLOOD PRESSURE: 128 MMHG | OXYGEN SATURATION: 99 % | HEART RATE: 74 BPM | DIASTOLIC BLOOD PRESSURE: 76 MMHG | HEIGHT: 66 IN | BODY MASS INDEX: 28.12 KG/M2 | WEIGHT: 175 LBS

## 2024-05-08 DIAGNOSIS — Z3A.01 LESS THAN 8 WEEKS GESTATION OF PREGNANCY: ICD-10-CM

## 2024-05-08 DIAGNOSIS — R56.9 UNSPECIFIED CONVULSIONS: ICD-10-CM

## 2024-05-08 DIAGNOSIS — Z3A.11 11 WEEKS GESTATION OF PREGNANCY: ICD-10-CM

## 2024-05-08 PROCEDURE — 99213 OFFICE O/P EST LOW 20 MIN: CPT

## 2024-05-08 RX ORDER — OXCARBAZEPINE 300 MG/1
300 TABLET, FILM COATED ORAL
Qty: 90 | Refills: 0 | Status: ACTIVE | COMMUNITY
Start: 2022-05-24 | End: 1900-01-01

## 2024-05-08 RX ORDER — MIDAZOLAM 5 MG/.1ML
5 SPRAY NASAL
Qty: 2 | Refills: 0 | Status: ACTIVE | COMMUNITY
Start: 2021-12-09 | End: 1900-01-01

## 2024-05-08 RX ORDER — FOLIC ACID 1 MG/1
1 TABLET ORAL
Qty: 60 | Refills: 5 | Status: ACTIVE | COMMUNITY
Start: 2017-09-25 | End: 1900-01-01

## 2024-05-08 NOTE — HISTORY OF PRESENT ILLNESS
[FreeTextEntry1] : Follow up HPI   5/8/2024  Miscarriages again in Feb 12 weeks. This is her third miscarriages Pregnant again.  No auras No levels  Weight same.   SleepsOK No other medica;l issues   Meds: Trileptal 300 BID Folic Acid 2mg BID  HPI    11/8/23  37 y/o female No seizures No auras sleeps OK No issues/  Meds: Trileptal 300 BID Folic Acid 1mg BID  HPI 1/12/23 Gave birth June 28th, 2022 at 36 weeks, baby born breach, 5lbs, 8oz. Healthy baby, 7th child Auras in June, increased Trileptal, no auras or seizures since Checked Trileptal level with PCP, reports it was 13  Meds: Trileptal 450 BID Folic Acid 1mg BID  History of Present Illness  37 years old female being seen for a follow up visit  32 weeks gestation 7/23/2022 due date  patient has a history of focal epilepsy with auditory aura.  Patient says she has been feeling well, no symptoms or adverse effects reported since on Trileptal medication.  Denies seizure activities.  Last aura was 3 weeks ago, described aura as hearing noises on both side of the ears. Patient said she was dehydrated.   Continue home medication with Trileptal:  600 mg in the morning 750 mg at night  Rose is advised to call the office after giving birth.   last Sz GTC May 2019 HA when she forgets the medicine

## 2024-05-28 RX ORDER — OXCARBAZEPINE 300 MG/1
300 TABLET, FILM COATED ORAL TWICE DAILY
Qty: 150 | Refills: 3 | Status: ACTIVE | COMMUNITY
Start: 2019-04-10

## 2024-07-19 ENCOUNTER — APPOINTMENT (OUTPATIENT)
Dept: NEUROLOGY | Facility: CLINIC | Age: 39
End: 2024-07-19
Payer: MEDICAID

## 2024-07-19 DIAGNOSIS — R56.9 UNSPECIFIED CONVULSIONS: ICD-10-CM

## 2024-07-19 PROCEDURE — 99214 OFFICE O/P EST MOD 30 MIN: CPT

## 2024-08-28 ENCOUNTER — APPOINTMENT (OUTPATIENT)
Dept: NEUROLOGY | Facility: CLINIC | Age: 39
End: 2024-08-28
Payer: MEDICAID

## 2024-08-28 VITALS
OXYGEN SATURATION: 98 % | SYSTOLIC BLOOD PRESSURE: 108 MMHG | WEIGHT: 189 LBS | BODY MASS INDEX: 30.51 KG/M2 | DIASTOLIC BLOOD PRESSURE: 70 MMHG | HEART RATE: 63 BPM

## 2024-08-28 DIAGNOSIS — R56.9 UNSPECIFIED CONVULSIONS: ICD-10-CM

## 2024-08-28 PROCEDURE — 99214 OFFICE O/P EST MOD 30 MIN: CPT

## 2024-08-28 PROCEDURE — G2211 COMPLEX E/M VISIT ADD ON: CPT | Mod: NC

## 2024-08-28 NOTE — ASSESSMENT
[FreeTextEntry1] : trileptal 750 BID Folic acid 2 mg Trileptal level due to side effects  RTC in nov

## 2024-08-28 NOTE — HISTORY OF PRESENT ILLNESS
[FreeTextEntry1] : Follow up  HPI      8/28/24  OXC dose up to 750 mg BID Very sleepy w increase Sz on July was a hard one.   Pregnancy 20 weeks DD: January [birth year].  USD normal.  Working full time All kids OK at home.   7/19/24 HPI: Rose is a 39 year old female presenting for a follow up visit for seizures.  Recent seizure at 13 weeks pregnant. Presented the same as usual - felt aura coming on and contacted  to help immediately before she lost consciousness.  wasn't able to get to pt for a few minutes, seizure was over by then and he did not use Nayzilam. Very confused for the rest of the day, does not remember much of the afternoon. Pt self-increased Oxcarb dose from 450mg / 600mg to 600mg BID. Sent script for blood work, but has not gone to the lab yet.  Has not followed up with OB.  Last Oxcarb level drawn at 8 weeks pregnant, was 9 on 450mg BID. Recommended increasing to 450mg / 600mg and rechecking level in 4 weeks, but pt never followed up. HPI   5/8/2024  Miscarriages again in Feb 12 weeks. This is her third miscarriages Pregnant again.  No auras No levels  Weight same.   SleepsOK No other medica;l issues   Meds: Trileptal 300 BID Folic Acid 2mg BID  HPI    11/8/23  37 y/o female No seizures No auras sleeps OK No issues/  Meds: Trileptal 300 BID Folic Acid 1mg BID  HPI 1/12/23 Gave birth June 28th, 2022 at 36 weeks, baby born breach, 5lbs, 8oz. Healthy baby, 7th child Auras in June, increased Trileptal, no auras or seizures since Checked Trileptal level with PCP, reports it was 13  Meds: Trileptal 450 BID Folic Acid 1mg BID  History of Present Illness  37 years old female being seen for a follow up visit  32 weeks gestation 7/23/2022 due date  patient has a history of focal epilepsy with auditory aura.  Patient says she has been feeling well, no symptoms or adverse effects reported since on Trileptal medication.  Denies seizure activities.  Last aura was 3 weeks ago, described aura as hearing noises on both side of the ears. Patient said she was dehydrated.   Continue home medication with Trileptal:  600 mg in the morning 750 mg at night  Rose is advised to call the office after giving birth.   last Sz GTC May 2019 HA when she forgets the medicine

## 2024-08-28 NOTE — DISCUSSION/SUMMARY
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   Stable Pregnancy 20 weeks Mild side effects

## 2024-11-13 ENCOUNTER — APPOINTMENT (OUTPATIENT)
Dept: NEUROLOGY | Facility: CLINIC | Age: 39
End: 2024-11-13
Payer: MEDICAID

## 2024-11-13 VITALS
DIASTOLIC BLOOD PRESSURE: 72 MMHG | HEIGHT: 66 IN | HEART RATE: 85 BPM | SYSTOLIC BLOOD PRESSURE: 121 MMHG | BODY MASS INDEX: 31.98 KG/M2 | WEIGHT: 199 LBS | OXYGEN SATURATION: 100 %

## 2024-11-13 DIAGNOSIS — Z3A.32 32 WEEKS GESTATION OF PREGNANCY: ICD-10-CM

## 2024-11-13 DIAGNOSIS — R56.9 UNSPECIFIED CONVULSIONS: ICD-10-CM

## 2024-11-13 PROCEDURE — 99214 OFFICE O/P EST MOD 30 MIN: CPT

## 2024-11-13 PROCEDURE — G2211 COMPLEX E/M VISIT ADD ON: CPT | Mod: NC

## 2025-01-08 ENCOUNTER — NON-APPOINTMENT (OUTPATIENT)
Age: 40
End: 2025-01-08

## 2025-02-19 ENCOUNTER — APPOINTMENT (OUTPATIENT)
Dept: NEUROLOGY | Facility: CLINIC | Age: 40
End: 2025-02-19
Payer: MEDICAID

## 2025-02-19 VITALS
SYSTOLIC BLOOD PRESSURE: 114 MMHG | WEIGHT: 195 LBS | HEIGHT: 66 IN | OXYGEN SATURATION: 98 % | DIASTOLIC BLOOD PRESSURE: 72 MMHG | HEART RATE: 67 BPM | BODY MASS INDEX: 31.34 KG/M2

## 2025-02-19 PROCEDURE — 99214 OFFICE O/P EST MOD 30 MIN: CPT

## 2025-02-19 PROCEDURE — G2211 COMPLEX E/M VISIT ADD ON: CPT | Mod: NC

## 2025-02-19 RX ORDER — DIAZEPAM 10 MG/100UL
10 SPRAY NASAL
Qty: 2 | Refills: 0 | Status: ACTIVE | COMMUNITY
Start: 2025-02-19 | End: 1900-01-01

## 2025-02-23 LAB — OXCARBAZEPINE SERPL-MCNC: 21 UG/ML
